# Patient Record
Sex: FEMALE | Race: WHITE | NOT HISPANIC OR LATINO | URBAN - METROPOLITAN AREA
[De-identification: names, ages, dates, MRNs, and addresses within clinical notes are randomized per-mention and may not be internally consistent; named-entity substitution may affect disease eponyms.]

---

## 2017-01-13 ENCOUNTER — COMMUNICATION - HEALTHEAST (OUTPATIENT)
Dept: FAMILY MEDICINE | Facility: CLINIC | Age: 37
End: 2017-01-13

## 2017-01-13 DIAGNOSIS — H10.9 CONJUNCTIVITIS: ICD-10-CM

## 2017-01-17 ENCOUNTER — PRENATAL OFFICE VISIT - HEALTHEAST (OUTPATIENT)
Dept: FAMILY MEDICINE | Facility: CLINIC | Age: 37
End: 2017-01-17

## 2017-01-17 DIAGNOSIS — Z34.92 SUPERVISION OF NORMAL PREGNANCY IN SECOND TRIMESTER: ICD-10-CM

## 2017-01-17 DIAGNOSIS — Z34.82 ENCOUNTER FOR SUPERVISION OF OTHER NORMAL PREGNANCY IN SECOND TRIMESTER: ICD-10-CM

## 2017-01-17 DIAGNOSIS — E03.9 HYPOTHYROID: ICD-10-CM

## 2017-02-21 ENCOUNTER — PRENATAL OFFICE VISIT - HEALTHEAST (OUTPATIENT)
Dept: FAMILY MEDICINE | Facility: CLINIC | Age: 37
End: 2017-02-21

## 2017-02-21 DIAGNOSIS — Z34.03 SUPERVISION OF NORMAL FIRST PREGNANCY IN THIRD TRIMESTER: ICD-10-CM

## 2017-02-21 DIAGNOSIS — Z23 NEED FOR IMMUNIZATION AGAINST INFLUENZA: ICD-10-CM

## 2017-02-28 ENCOUNTER — AMBULATORY - HEALTHEAST (OUTPATIENT)
Dept: LAB | Facility: CLINIC | Age: 37
End: 2017-02-28

## 2017-02-28 DIAGNOSIS — Z34.03 SUPERVISION OF NORMAL FIRST PREGNANCY IN THIRD TRIMESTER: ICD-10-CM

## 2017-03-01 LAB — SYPHILIS RPR SCREEN - HISTORICAL: NORMAL

## 2017-04-04 ENCOUNTER — PRENATAL OFFICE VISIT - HEALTHEAST (OUTPATIENT)
Dept: FAMILY MEDICINE | Facility: CLINIC | Age: 37
End: 2017-04-04

## 2017-04-04 ENCOUNTER — COMMUNICATION - HEALTHEAST (OUTPATIENT)
Dept: FAMILY MEDICINE | Facility: CLINIC | Age: 37
End: 2017-04-04

## 2017-04-04 DIAGNOSIS — E03.9 HYPOTHYROID: ICD-10-CM

## 2017-04-04 DIAGNOSIS — O36.8390 FETAL ARRHYTHMIA AFFECTING PREGNANCY, ANTEPARTUM: ICD-10-CM

## 2017-04-04 DIAGNOSIS — E66.9 OBESITY: ICD-10-CM

## 2017-04-04 DIAGNOSIS — Z34.93 SUPERVISION OF NORMAL PREGNANCY IN THIRD TRIMESTER: ICD-10-CM

## 2017-04-04 RX ORDER — LEVOTHYROXINE SODIUM 100 UG/1
100 TABLET ORAL DAILY
Qty: 108 TABLET | Refills: 1 | Status: SHIPPED | OUTPATIENT
Start: 2017-04-04

## 2017-04-05 ENCOUNTER — COMMUNICATION - HEALTHEAST (OUTPATIENT)
Dept: FAMILY MEDICINE | Facility: CLINIC | Age: 37
End: 2017-04-05

## 2017-04-06 ENCOUNTER — COMMUNICATION - HEALTHEAST (OUTPATIENT)
Dept: FAMILY MEDICINE | Facility: CLINIC | Age: 37
End: 2017-04-06

## 2017-04-06 ENCOUNTER — RECORDS - HEALTHEAST (OUTPATIENT)
Dept: ADMINISTRATIVE | Facility: OTHER | Age: 37
End: 2017-04-06

## 2017-04-13 ENCOUNTER — COMMUNICATION - HEALTHEAST (OUTPATIENT)
Dept: FAMILY MEDICINE | Facility: CLINIC | Age: 37
End: 2017-04-13

## 2017-04-18 ENCOUNTER — PRENATAL OFFICE VISIT - HEALTHEAST (OUTPATIENT)
Dept: FAMILY MEDICINE | Facility: CLINIC | Age: 37
End: 2017-04-18

## 2017-04-18 ENCOUNTER — COMMUNICATION - HEALTHEAST (OUTPATIENT)
Dept: FAMILY MEDICINE | Facility: CLINIC | Age: 37
End: 2017-04-18

## 2017-04-18 DIAGNOSIS — E03.9 HYPOTHYROID: ICD-10-CM

## 2017-04-18 DIAGNOSIS — Z34.93 SUPERVISION OF NORMAL PREGNANCY IN THIRD TRIMESTER: ICD-10-CM

## 2017-04-19 ENCOUNTER — COMMUNICATION - HEALTHEAST (OUTPATIENT)
Dept: FAMILY MEDICINE | Facility: CLINIC | Age: 37
End: 2017-04-19

## 2017-04-19 ENCOUNTER — PRENATAL OFFICE VISIT - HEALTHEAST (OUTPATIENT)
Dept: FAMILY MEDICINE | Facility: CLINIC | Age: 37
End: 2017-04-19

## 2017-04-19 DIAGNOSIS — Z34.93 SUPERVISION OF NORMAL PREGNANCY IN THIRD TRIMESTER: ICD-10-CM

## 2017-04-19 DIAGNOSIS — N28.9 RENAL INSUFFICIENCY: ICD-10-CM

## 2017-04-23 ENCOUNTER — RECORDS - HEALTHEAST (OUTPATIENT)
Dept: ADMINISTRATIVE | Facility: OTHER | Age: 37
End: 2017-04-23

## 2017-04-24 ENCOUNTER — COMMUNICATION - HEALTHEAST (OUTPATIENT)
Dept: FAMILY MEDICINE | Facility: CLINIC | Age: 37
End: 2017-04-24

## 2017-04-24 ENCOUNTER — RECORDS - HEALTHEAST (OUTPATIENT)
Dept: ADMINISTRATIVE | Facility: OTHER | Age: 37
End: 2017-04-24

## 2017-05-02 ENCOUNTER — RECORDS - HEALTHEAST (OUTPATIENT)
Dept: ADMINISTRATIVE | Facility: OTHER | Age: 37
End: 2017-05-02

## 2017-05-05 ENCOUNTER — COMMUNICATION - HEALTHEAST (OUTPATIENT)
Dept: FAMILY MEDICINE | Facility: CLINIC | Age: 37
End: 2017-05-05

## 2021-05-30 VITALS — BODY MASS INDEX: 39.29 KG/M2 | WEIGHT: 249 LBS

## 2021-05-30 VITALS — BODY MASS INDEX: 39.61 KG/M2 | WEIGHT: 251 LBS

## 2021-05-30 VITALS — WEIGHT: 257 LBS | BODY MASS INDEX: 40.55 KG/M2

## 2021-05-30 VITALS — BODY MASS INDEX: 38.34 KG/M2 | WEIGHT: 243 LBS

## 2021-06-09 NOTE — PROGRESS NOTES
No ctx, lof, bleeding, or dc.  No HA or vision changes.  Again, cannot stay for GCT.  Advised to schedule lab only.  Will check TSH then as well.    Supervision of normal first pregnancy in third trimester  - Glucose,Gestational Challenge (1 Hour); Future  - Syphilis Screen, [place]; Future  - Hemoglobin; Future  - Thyroid Stimulating Hormone (TSH); Future    Need for immunization against influenza  - Influenza, Seasonal Quad, Preservative Free 36+ Months    She declined Tdap.

## 2021-06-09 NOTE — PROGRESS NOTES
Fetal heart rate with obvious arrhythmia.  Discussed with Dr. Lozada from [facility].  He recommended fetal echocardiogram this week.    Repeat TSH and fT4.  BMP for mom.  Follow up next week.

## 2021-06-10 NOTE — PROGRESS NOTES
No ctx, lof, bleeding, or dc.  No HA or vision changes.   Had a little low back pain last night.  After cervical check, today, had some spotting.  Advised follow up if spotting continues.  Kick counts discussed.    NST was done.  Baseline 140, moderate variability, has accelerations, no decelerations  [place]: quiet  Labs drawn.    Fetal echo reviewed:  Structurally normal fetal heart with occasional benign ectopy, premature atrial contractions.   I don't hear any ectopy today.

## 2021-06-10 NOTE — PROGRESS NOTES
PCP out of office today.   Asked to follow up today from yesterday's visit due to elevated Cr.   Her blood pressure is normal. Urine is normal. She feels well today. No contractions, lof, bleeding. +Fm.   She denies swelling, vision changes, abdominal pain.   We discussed repeat bmp. Results reviewed and with minimal improvement, no symptoms, patient advised to monitor for any abnormal symptoms, follow up early next week.   Discussed warning signs and reasons to be seen tomorrow or this weekend.   Of note, patient reports she was planning to delivery at [facility]. We discussed Dr. Lozoya works at [employer] [facility]. She will likely be delivering at [facility] then.

## 2021-06-15 PROBLEM — E66.9 OBESITY: Status: ACTIVE | Noted: 2017-04-04

## 2021-07-03 NOTE — ADDENDUM NOTE
Addendum Note by Mando Corado MD at 1/13/2017 11:08 AM     Author: Mando Corado MD Service: -- Author Type: Physician    Filed: 1/13/2017 11:08 AM Encounter Date: 1/13/2017 Status: Signed    : Mando Corado MD (Physician)    Addended by: MANDO OCRADO on: 1/13/2017 11:08 AM        Modules accepted: Orders

## 2021-07-03 NOTE — ADDENDUM NOTE
Addendum Note by Mando Corado MD at 4/4/2017  2:28 PM     Author: Mando Corado MD Service: -- Author Type: Physician    Filed: 4/4/2017  2:28 PM Encounter Date: 4/4/2017 Status: Signed    : Mando Corado MD (Physician)    Addended by: MANDO CORADO on: 4/4/2017 02:28 PM        Modules accepted: Orders